# Patient Record
Sex: MALE | Race: WHITE
[De-identification: names, ages, dates, MRNs, and addresses within clinical notes are randomized per-mention and may not be internally consistent; named-entity substitution may affect disease eponyms.]

---

## 2022-06-28 ENCOUNTER — HOSPITAL ENCOUNTER (OUTPATIENT)
Dept: HOSPITAL 47 - RADUSWWP | Age: 67
Discharge: HOME | End: 2022-06-28
Attending: FAMILY MEDICINE
Payer: MEDICARE

## 2022-06-28 DIAGNOSIS — Z13.6: Primary | ICD-10-CM

## 2022-06-28 DIAGNOSIS — I77.811: ICD-10-CM

## 2022-06-28 PROCEDURE — 93979 VASCULAR STUDY: CPT

## 2022-06-28 NOTE — US
EXAMINATION TYPE: US duplex aorta

 

DATE OF EXAM: 6/28/2022

 

COMPARISON: NONE

 

CLINICAL HISTORY: 66-year-old male Z13.6 AAA Screening. Pt has no complaints at this time, AAA screen
ing   

 

TECHNIQUE: Multiple sonographic images of the abdominal aorta are obtained.

 

FINDINGS:

 

EXAM MEASUREMENTS:

 

Abdominal Aorta:

** Proximal:  2.6 x 2.8 cm

** Mid:  2.1 x 2.0 cm

** Distal:  2.1 x 2.0 cm

** Bifurcation:  CAYDEN: 1.5 x 1.5 cm   HUGO: 1.5 x 1.5 cm

 

Sonographer notes:**Aorta appeared ectatic with the proximal portion at upper limits of normal for me
asurements, however aorta did not measure > 3cm at any location**

 

 

 

IMPRESSION:

1. Ectatic upper abdominal aorta measuring up to 2.8 cm. The right and left common iliac arteries are
 also borderline ectatic at 1.5 cm on either side.

2. No sonographic evidence for AAA.

## 2022-07-29 ENCOUNTER — HOSPITAL ENCOUNTER (OUTPATIENT)
Dept: HOSPITAL 47 - ORWHC2ENDO | Age: 67
Discharge: HOME | End: 2022-07-29
Attending: INTERNAL MEDICINE
Payer: MEDICARE

## 2022-07-29 VITALS — HEART RATE: 81 BPM | SYSTOLIC BLOOD PRESSURE: 145 MMHG | DIASTOLIC BLOOD PRESSURE: 79 MMHG

## 2022-07-29 VITALS — BODY MASS INDEX: 31.6 KG/M2

## 2022-07-29 VITALS — TEMPERATURE: 97.4 F | RESPIRATION RATE: 16 BRPM

## 2022-07-29 DIAGNOSIS — K63.5: ICD-10-CM

## 2022-07-29 DIAGNOSIS — Z79.899: ICD-10-CM

## 2022-07-29 DIAGNOSIS — Z87.891: ICD-10-CM

## 2022-07-29 DIAGNOSIS — K57.30: ICD-10-CM

## 2022-07-29 DIAGNOSIS — Z12.11: Primary | ICD-10-CM

## 2022-07-29 DIAGNOSIS — K64.8: ICD-10-CM

## 2022-07-29 DIAGNOSIS — I10: ICD-10-CM

## 2022-07-29 PROCEDURE — 45385 COLONOSCOPY W/LESION REMOVAL: CPT

## 2022-07-29 PROCEDURE — 88305 TISSUE EXAM BY PATHOLOGIST: CPT

## 2022-07-29 NOTE — P.PCN
Date of Procedure: 07/29/22


Procedure(s) Performed: 


BRIEF HISTORY: Patient is a 66-year-old pleasant white male scheduled for an 

elective colonoscopy as a part of screening for colon rectal neoplasia.





PROCEDURE PERFORMED: Colonoscopy with snare polypectomy. 





PREOPERATIVE DIAGNOSIS: Screening for colon cancer. 





IV sedation per Anesthesia. 





PROCEDURE: After informed consent was obtained, the patient, was brought into 

the endoscopy unit. IV sedation was administered by Anesthesia under continuous 

monitoring.  Digital rectal examination was normal. Initially the Olympus CF-160

flexible video colonoscope was then inserted in the rectum, gradually advanced 

into the cecum without any difficulty. Careful examination was performed as the 

scope was gradually being withdrawn. Ileocecal valve and the appendiceal orifice

were visualized and appeared normal.  Prep was fair.. Mucosa of the cecum, 

ascending colon, appeared normal.  The hepatic flexure there was a 5-6 mm 

sessile polyp removed by snare polypectomy.  Rest of the transverse colon, 

descending colon, sigmoid colon, and rectum appeared normal.  Diffuse scattered 

diverticulosis noted throughout the entire colon.  Retroflexion was performed in

the rectum and small internal hemorrhoids were seen. The patient tolerated the 

procedure well. 





IMPRESSION: 


5-6 mm hepatic flexure polyp status post polypectomy


Diffuse scattered diverticulosis throughout the entire colon


Small internal hemorrhoids





RECOMMENDATIONS:  Findings of this examination were discussed with the patient 

as well as his family.  He was advised to follow with the biopsy results.  If 

the biopsy reveals adenoma he can have a repeat colonoscopy in 5 years.. Consent: The patient's consent was obtained including but not limited to risks of crusting, scabbing, blistering, scarring, darker or lighter pigmentary change, recurrence, incomplete removal and infection. Post-Care Instructions: I reviewed with the patient in detail post-care instructions. Patient is to wear sunprotection, and avoid picking at any of the treated lesions. Pt may apply Vaseline to crusted or scabbing areas. Total Number Of Lesions Treated: 5 Anesthesia Volume In Cc: 0.5 Price (Use Numbers Only, No Special Characters Or $): 199.00 Detail Level: Zone